# Patient Record
Sex: FEMALE | Race: WHITE | NOT HISPANIC OR LATINO | Employment: STUDENT | URBAN - METROPOLITAN AREA
[De-identification: names, ages, dates, MRNs, and addresses within clinical notes are randomized per-mention and may not be internally consistent; named-entity substitution may affect disease eponyms.]

---

## 2024-04-15 ENCOUNTER — OFFICE VISIT (OUTPATIENT)
Dept: URGENT CARE | Facility: CLINIC | Age: 19
End: 2024-04-15
Payer: COMMERCIAL

## 2024-04-15 VITALS
HEART RATE: 83 BPM | BODY MASS INDEX: 20.83 KG/M2 | HEIGHT: 65 IN | RESPIRATION RATE: 16 BRPM | WEIGHT: 125 LBS | DIASTOLIC BLOOD PRESSURE: 76 MMHG | TEMPERATURE: 99 F | OXYGEN SATURATION: 98 % | SYSTOLIC BLOOD PRESSURE: 125 MMHG

## 2024-04-15 DIAGNOSIS — Z02.0 SCHOOL PHYSICAL EXAM: Primary | ICD-10-CM

## 2024-04-15 DIAGNOSIS — Z11.1 ENCOUNTER FOR PPD SKIN TEST READING: ICD-10-CM

## 2024-04-15 PROCEDURE — 99499 UNLISTED E&M SERVICE: CPT | Mod: S$GLB,,, | Performed by: STUDENT IN AN ORGANIZED HEALTH CARE EDUCATION/TRAINING PROGRAM

## 2024-04-15 PROCEDURE — 86580 TB INTRADERMAL TEST: CPT | Mod: S$GLB,,, | Performed by: STUDENT IN AN ORGANIZED HEALTH CARE EDUCATION/TRAINING PROGRAM

## 2024-04-15 NOTE — PROGRESS NOTES
"Subjective:      Patient ID: Carmencita Groves is a 18 y.o. female.    Vitals:  height is 5' 5" (1.651 m) and weight is 56.7 kg (125 lb). Her oral temperature is 98.7 °F (37.1 °C). Her blood pressure is 125/76 and her pulse is 83. Her respiration is 16 and oxygen saturation is 98%.     Chief Complaint: Physical Exam    The patient is coming in for nursing school physical.  Patient got a TB skin test.  Patient brought a copy of her vaccine records.        Constitution: Negative for fever.   Cardiovascular:  Negative for chest pain.   Respiratory:  Negative for cough and shortness of breath.       Objective:     Physical Exam   Constitutional:      Comments:Please see uploaded documents         Assessment:     1. School physical exam    2. Encounter for PPD skin test reading      Vision Screening    Right eye Left eye Both eyes   Without correction      With correction 20/20 20/20 20/20       Plan:       School physical exam    Encounter for PPD skin test reading  -     POCT TB Skin Test Read    Patient was notified when to return for TB skin test reading      Medical Decision Making:   Differential Diagnosis:   Nursing school physical completed  Urgent Care Management:  Nursing school physical completed.  Patient also got a TB skin test while she was here.           "

## 2024-04-17 LAB
TB INDURATION - 48 HR READ: 0 MM
TB INDURATION - 72 HR READ: NORMAL
TB SKIN TEST - 48 HR READ: NEGATIVE
TB SKIN TEST - 72 HR READ: NORMAL

## 2024-06-25 ENCOUNTER — OFFICE VISIT (OUTPATIENT)
Dept: URGENT CARE | Facility: CLINIC | Age: 19
End: 2024-06-25
Payer: COMMERCIAL

## 2024-06-25 VITALS
RESPIRATION RATE: 18 BRPM | HEART RATE: 68 BPM | DIASTOLIC BLOOD PRESSURE: 77 MMHG | BODY MASS INDEX: 20.83 KG/M2 | SYSTOLIC BLOOD PRESSURE: 126 MMHG | WEIGHT: 125 LBS | TEMPERATURE: 98 F | OXYGEN SATURATION: 98 % | HEIGHT: 65 IN

## 2024-06-25 DIAGNOSIS — L23.7 POISON IVY DERMATITIS: Primary | ICD-10-CM

## 2024-06-25 DIAGNOSIS — R21 RASH: ICD-10-CM

## 2024-06-25 PROCEDURE — 99213 OFFICE O/P EST LOW 20 MIN: CPT | Mod: 25,S$GLB,, | Performed by: STUDENT IN AN ORGANIZED HEALTH CARE EDUCATION/TRAINING PROGRAM

## 2024-06-25 PROCEDURE — 96372 THER/PROPH/DIAG INJ SC/IM: CPT | Mod: S$GLB,,, | Performed by: STUDENT IN AN ORGANIZED HEALTH CARE EDUCATION/TRAINING PROGRAM

## 2024-06-25 RX ORDER — METHYLPREDNISOLONE ACETATE 80 MG/ML
40 INJECTION, SUSPENSION INTRA-ARTICULAR; INTRALESIONAL; INTRAMUSCULAR; SOFT TISSUE
Status: COMPLETED | OUTPATIENT
Start: 2024-06-25 | End: 2024-06-25

## 2024-06-25 RX ORDER — METHYLPREDNISOLONE 4 MG/1
TABLET ORAL
Qty: 21 EACH | Refills: 0 | Status: SHIPPED | OUTPATIENT
Start: 2024-06-25

## 2024-06-25 RX ORDER — HYDROXYZINE HYDROCHLORIDE 25 MG/1
25 TABLET, FILM COATED ORAL 3 TIMES DAILY PRN
Qty: 30 TABLET | Refills: 0 | Status: SHIPPED | OUTPATIENT
Start: 2024-06-25

## 2024-06-25 RX ADMIN — METHYLPREDNISOLONE ACETATE 40 MG: 80 INJECTION, SUSPENSION INTRA-ARTICULAR; INTRALESIONAL; INTRAMUSCULAR; SOFT TISSUE at 11:06

## 2024-06-25 NOTE — PROGRESS NOTES
"Subjective:      Patient ID: Carmencita Warren is a 18 y.o. female.    Vitals:  height is 5' 5" (1.651 m) and weight is 56.7 kg (125 lb). Her oral temperature is 98.1 °F (36.7 °C). Her blood pressure is 126/77 and her pulse is 68. Her respiration is 18 and oxygen saturation is 98%.     Chief Complaint: Rash    Pt presents with rash on right side, chest, and ankle. Possible poison ivy rash. She has been using triamcinolone steroid cream with little relief.    Rash  This is a new problem. The current episode started in the past 7 days. The problem is unchanged. The affected locations include the right hip, chest and left ankle. The rash is characterized by itchiness and redness. She was exposed to plant contact. Past treatments include topical steroids and anti-itch cream. The treatment provided no relief.     Skin:  Positive for rash.    Objective:     Physical Exam    Assessment:     No diagnosis found.    Plan:       There are no diagnoses linked to this encounter.                "

## 2024-06-25 NOTE — PATIENT INSTRUCTIONS
Please follow up with your primary care provider within 2-5 days if your signs and symptoms have not resolved or worsen.     If your condition worsens or fails to improve we recommend that you receive another evaluation at the emergency room immediately or contact your primary medical clinic to discuss your concerns.   You must understand that you have received an Urgent Care treatment only and that you may be released before all of your medical problems are known or treated. You, the patient, will arrange for follow up care as instructed.        Please start taking steroid pack tomorrow. You can take the atarax at night time for any itching. Please do not apply the topical steroid cream for no more than 10 days.  
No Vaccines Administered.

## 2024-07-03 NOTE — PROGRESS NOTES
"Subjective:      Patient ID: Carmencita Warren is a 19 y.o. female.    Vitals:  height is 5' 5" (1.651 m) and weight is 56.7 kg (125 lb). Her oral temperature is 98.1 °F (36.7 °C). Her blood pressure is 126/77 and her pulse is 68. Her respiration is 18 and oxygen saturation is 98%.     Chief Complaint: Rash    Pt presents with rash on right flank area, chest, and ankle. Possible poison ivy rash. She has been using triamcinolone steroid cream with little relief.    Rash  This is a new problem. The current episode started in the past 7 days. The problem is unchanged. The affected locations include the right hip, chest and left ankle. The rash is characterized by itchiness and redness. She was exposed to plant contact. Past treatments include topical steroids and anti-itch cream. The treatment provided no relief.       Skin:  Positive for rash.      Objective:     Physical Exam  HENT:   Head: Normocephalic and atraumatic.   Nose: Nose normal.   Abdominal: Normal appearance.   Neurological: She is alert.   Skin: Skin is rash and maculopapular.         Comments: Rash is on right flank area, chest, and ankle  The rash is maculopapular erythematous and pruritic, no drainage   Psychiatric: Her behavior is normal. Mood normal.      Assessment:     Poison ivy dermatitis   Plan:       Poison ivy dermatitis  -     methylPREDNISolone acetate injection 40 mg  -     methylPREDNISolone (MEDROL DOSEPACK) 4 mg tablet; use as directed  Dispense: 21 each; Refill: 0  -     hydrOXYzine HCL (ATARAX) 25 MG tablet; Take 1 tablet (25 mg total) by mouth 3 (three) times daily as needed for Itching.  Dispense: 30 tablet; Refill: 0    Rash  -     methylPREDNISolone acetate injection 40 mg  -     methylPREDNISolone (MEDROL DOSEPACK) 4 mg tablet; use as directed  Dispense: 21 each; Refill: 0  -     hydrOXYzine HCL (ATARAX) 25 MG tablet; Take 1 tablet (25 mg total) by mouth 3 (three) times daily as needed for Itching.  Dispense: 30 tablet; Refill: " 0      Please follow up with your primary care provider within 2-5 days if your signs and symptoms have not resolved or worsen.     If your condition worsens or fails to improve we recommend that you receive another evaluation at the emergency room immediately or contact your primary medical clinic to discuss your concerns.   You must understand that you have received an Urgent Care treatment only and that you may be released before all of your medical problems are known or treated. You, the patient, will arrange for follow up care as instructed.        Please start taking steroid pack tomorrow. You can take the atarax at night time for any itching. Please do not apply the topical steroid cream for no more than 10 days

## 2024-07-25 ENCOUNTER — OFFICE VISIT (OUTPATIENT)
Dept: OBSTETRICS AND GYNECOLOGY | Facility: CLINIC | Age: 19
End: 2024-07-25
Payer: COMMERCIAL

## 2024-07-25 VITALS
BODY MASS INDEX: 21.5 KG/M2 | HEART RATE: 73 BPM | SYSTOLIC BLOOD PRESSURE: 124 MMHG | DIASTOLIC BLOOD PRESSURE: 74 MMHG | WEIGHT: 129.19 LBS

## 2024-07-25 DIAGNOSIS — Z01.419 WELL WOMAN EXAM WITH ROUTINE GYNECOLOGICAL EXAM: Primary | ICD-10-CM

## 2024-07-25 DIAGNOSIS — Z76.89 ENCOUNTER TO ESTABLISH CARE: ICD-10-CM

## 2024-07-25 DIAGNOSIS — Z11.3 ENCOUNTER FOR SCREENING FOR INFECTIONS WITH PREDOMINANTLY SEXUAL MODE OF TRANSMISSION: ICD-10-CM

## 2024-07-25 PROCEDURE — 1160F RVW MEDS BY RX/DR IN RCRD: CPT | Mod: CPTII,S$GLB,,

## 2024-07-25 PROCEDURE — 1159F MED LIST DOCD IN RCRD: CPT | Mod: CPTII,S$GLB,,

## 2024-07-25 PROCEDURE — 3074F SYST BP LT 130 MM HG: CPT | Mod: CPTII,S$GLB,,

## 2024-07-25 PROCEDURE — 99395 PREV VISIT EST AGE 18-39: CPT | Mod: S$GLB,,,

## 2024-07-25 PROCEDURE — 3078F DIAST BP <80 MM HG: CPT | Mod: CPTII,S$GLB,,

## 2024-07-25 PROCEDURE — 99459 PELVIC EXAMINATION: CPT | Mod: S$GLB,,,

## 2024-07-25 PROCEDURE — 3008F BODY MASS INDEX DOCD: CPT | Mod: CPTII,S$GLB,,

## 2024-07-25 NOTE — PROGRESS NOTES
Subjective:      Patient ID: Cramencita Warren is a 19 y.o. female who presents for evaluation today.    Chief Complaint:    Well Woman and Contraception (Pt states that she stopped her Nuva Ring. She stopped it the end of June.)      History of Present Illness  HPI  Annual Exam (Premenopausal) - Patient presents for annual exam. The patient has no complaints today. The patient is sexually active. GYN screening history: no prior history of gyn screening tests. The patient wears seatbelts: yes. The patient participates in regular exercise: yes. Has the patient ever been transfused or tattooed?: not asked. The patient reports that there is not domestic violence in her life. She reports regular periods. She is using condoms for birth control. She denies GI or  problems.    GYN History  Patient's last menstrual period was 06/05/2024 (within weeks).   Date of Last Pap: N/A    VITALS  /74   Pulse 73   Wt 58.6 kg (129 lb 3.2 oz)   LMP 06/05/2024 (Within Weeks)   BMI 21.50 kg/m²   Weight: 58.6 kg (129 lb 3.2 oz)         PAST MEDICAL HISTORY  Past Medical History:   Diagnosis Date    ASD (atrial septal defect)     Heart murmur 2004       PAST SURGICAL HISTORY  Past Surgical History:   Procedure Laterality Date    ASD REPAIR  11/02/2010       SOCIAL HISTORY  Social History     Tobacco Use   Smoking Status Never   Smokeless Tobacco Never   ,   Social History     Substance and Sexual Activity   Alcohol Use Not Currently    Alcohol/week: 2.0 standard drinks of alcohol    Types: 2 Cans of beer per week        MEDICATIONS  No outpatient medications have been marked as taking for the 7/25/24 encounter (Office Visit) with Maite Williamson NP.         Review of Systems   Review of Systems   Constitutional:  Negative for activity change.   Eyes:  Negative for visual disturbance.   Respiratory:  Negative for shortness of breath.    Cardiovascular:  Negative for chest pain.   Gastrointestinal:  Negative for abdominal  pain.   Genitourinary:  Negative for vaginal bleeding.        No abnormal vaginal bleeding   Musculoskeletal:  Negative for back pain.   Integumentary:  Negative for rash and breast mass.   Neurological:  Negative for numbness.   Psychiatric/Behavioral:          No mood disturbance or changes    Breast: Negative for mass          Objective:     Physical Exam:   Constitutional: She is oriented to person, place, and time. She appears well-developed. She is cooperative.    HENT:   Head: Normocephalic.     Neck: Trachea normal. No thyromegaly present.    Cardiovascular:  Normal rate, regular rhythm and normal heart sounds.             Pulmonary/Chest: Effort normal and breath sounds normal.        Abdominal: Soft. There is no abdominal tenderness. There is no rebound and no guarding.     Genitourinary:    Vagina and uterus normal.      Pelvic exam was performed with patient supine.   The external female genitalia was normal.     Labial bartholins normal.There is no lesion on the right labia. There is no lesion on the left labia. Cervix is normal. Right adnexum displays no mass and no tenderness. Left adnexum displays no mass and no tenderness. Cervix exhibits no discharge. Uterus is not enlarged and not tender.              Lymphadenopathy:        Head (right side): No submental and no submandibular adenopathy present.        Head (left side): No submental and no submandibular adenopathy present.     She has no cervical adenopathy.    Neurological: She is alert and oriented to person, place, and time.    Skin: Skin is warm.    Psychiatric: She has a normal mood and affect. Her speech is normal and behavior is normal. Thought content normal.          Assessment:     Well woman exam with routine gynecological exam    Encounter for screening for infections with predominantly sexual mode of transmission  -     C. trachomatis/N. gonorrhoeae by AMP DNA Other; Cervicovaginal    Encounter to establish care  -     Ambulatory  referral/consult to Internal Medicine; Future; Expected date: 08/01/2024         Plan:     Patient instructed to contact the clinic should any questions or concerns arise prior to her next office visit. Patient is happy with the plan of care at this time, verbalizes understanding and denies outstanding questions.      Self-breast exams  Birth Control discussed  If you don't hear from the office regarding results within 1 week, please call  Follow up in 1 year for annual or sooner as needed  Chaperone present for exam   Female chaperone present.

## 2024-10-30 ENCOUNTER — OFFICE VISIT (OUTPATIENT)
Dept: PRIMARY CARE CLINIC | Facility: CLINIC | Age: 19
End: 2024-10-30
Payer: COMMERCIAL

## 2024-10-30 VITALS
DIASTOLIC BLOOD PRESSURE: 76 MMHG | HEIGHT: 65 IN | BODY MASS INDEX: 21.69 KG/M2 | OXYGEN SATURATION: 99 % | SYSTOLIC BLOOD PRESSURE: 124 MMHG | WEIGHT: 130.19 LBS | HEART RATE: 68 BPM

## 2024-10-30 DIAGNOSIS — Q21.10 ASD (ATRIAL SEPTAL DEFECT): ICD-10-CM

## 2024-10-30 DIAGNOSIS — Z00.00 WELLNESS EXAMINATION: Primary | ICD-10-CM

## 2024-10-30 DIAGNOSIS — Z71.85 VACCINE COUNSELING: ICD-10-CM

## 2025-05-29 ENCOUNTER — PATIENT MESSAGE (OUTPATIENT)
Dept: OBSTETRICS AND GYNECOLOGY | Facility: CLINIC | Age: 20
End: 2025-05-29
Payer: COMMERCIAL

## 2025-05-30 ENCOUNTER — TELEPHONE (OUTPATIENT)
Dept: OBSTETRICS AND GYNECOLOGY | Facility: CLINIC | Age: 20
End: 2025-05-30
Payer: COMMERCIAL

## 2025-05-30 DIAGNOSIS — Z34.90 EARLY STAGE OF PREGNANCY: Primary | ICD-10-CM

## 2025-05-30 NOTE — TELEPHONE ENCOUNTER
Spoke with patient. Lmp is 4/30/25. Advised to let us know if she has any bleeding or cramping and to start a prental and to come with a full bladder. Scheduled initial and ultrasound. Patient does have maternity coverage

## 2025-06-25 ENCOUNTER — PROCEDURE VISIT (OUTPATIENT)
Dept: OBSTETRICS AND GYNECOLOGY | Facility: CLINIC | Age: 20
End: 2025-06-25
Payer: COMMERCIAL

## 2025-06-25 ENCOUNTER — INITIAL PRENATAL (OUTPATIENT)
Dept: OBSTETRICS AND GYNECOLOGY | Facility: CLINIC | Age: 20
End: 2025-06-25
Payer: COMMERCIAL

## 2025-06-25 VITALS
DIASTOLIC BLOOD PRESSURE: 55 MMHG | BODY MASS INDEX: 22.66 KG/M2 | SYSTOLIC BLOOD PRESSURE: 113 MMHG | HEART RATE: 87 BPM | WEIGHT: 136.19 LBS

## 2025-06-25 DIAGNOSIS — Z34.90 EARLY STAGE OF PREGNANCY: ICD-10-CM

## 2025-06-25 DIAGNOSIS — Z36.89 ENCOUNTER FOR FETAL ANATOMIC SURVEY: ICD-10-CM

## 2025-06-25 DIAGNOSIS — Z36.89 ENCOUNTER FOR ULTRASOUND TO CHECK FETAL GROWTH: ICD-10-CM

## 2025-06-25 DIAGNOSIS — Z34.90 PREGNANCY, UNSPECIFIED GESTATIONAL AGE: Primary | ICD-10-CM

## 2025-06-25 LAB
AMORPH URATE CRY URNS QL MICRO: NORMAL
BACTERIA #/AREA URNS HPF: NORMAL /[HPF]
BILIRUB UR QL STRIP: NEGATIVE
CLARITY UR: CLEAR
COLOR UR: YELLOW
EPITHELIAL CELLS: NEGATIVE
GLUCOSE (UA): NEGATIVE MG/DL
KETONES UR QL STRIP: NEGATIVE MG/DL
LEUKOCYTE ESTERASE UR QL STRIP: NEGATIVE
MUCOUS THREADS URNS QL MICRO: NEGATIVE
NITRITE UR QL STRIP: NEGATIVE
OCCULT BLOOD: NEGATIVE
PH, URINE: 6 (ref 5–7.5)
PROT UR QL STRIP: NEGATIVE MG/DL
RBC/HPF: NEGATIVE
REFERENCE LAB TEST: NORMAL
REFERENCE LAB TEST: NORMAL
SP GR UR STRIP: 1.01 (ref 1–1.03)
UROBILINOGEN, URINE: NORMAL E.U./DL (ref 0–1)
WBC/HPF: NEGATIVE

## 2025-06-25 PROCEDURE — 0500F INITIAL PRENATAL CARE VISIT: CPT | Mod: CPTII,,, | Performed by: OBSTETRICS & GYNECOLOGY

## 2025-06-25 PROCEDURE — 76801 OB US < 14 WKS SINGLE FETUS: CPT | Mod: 26,S$PBB,, | Performed by: OBSTETRICS & GYNECOLOGY

## 2025-06-25 RX ORDER — PROMETHAZINE HYDROCHLORIDE AND DEXTROMETHORPHAN HYDROBROMIDE 6.25; 15 MG/5ML; MG/5ML
SYRUP ORAL
COMMUNITY
Start: 2025-05-06

## 2025-06-25 RX ORDER — AMOXICILLIN AND CLAVULANATE POTASSIUM 875; 125 MG/1; MG/1
1 TABLET, FILM COATED ORAL 2 TIMES DAILY
COMMUNITY
Start: 2025-05-06

## 2025-06-25 RX ORDER — BENZONATATE 200 MG/1
CAPSULE ORAL
COMMUNITY
Start: 2025-05-06

## 2025-06-25 NOTE — PROGRESS NOTES
Discussed reasons to go to the office or to go to the emergency room ectopic and miscarriage precautions  Discussed over-the-counter medications and medications for nausea if needed.  Patient information regarding new pregnancy given  Questions answered at this time  Will return for a new OB appointment  Early DM and ASA assessment done    Counseled the patient regarding her due date and that we do not change her today based off of later ultrasounds.  Also discussed the importance of taking prenatal vitamins.  Discussed bleeding in pregnancy with intercourse or any exams in pregnancy.  Discussed when to call the office and when to go to labor and delivery.  We discussed the multiple marker screen as well as the other options available for her to determine common aneuploidy as well as foresight carrier screening.  We discussed nausea vomiting symptoms and medications to ameliorate symptoms.  We discussed the plans for ultrasounds throughout the pregnancy.  The patient was given a pregnancy pamphlet and information and all questions were answered   Will do preq and fs

## 2025-06-26 LAB
ABS NRBC COUNT: 0 X 10 3/UL (ref 0–0.01)
ABSOLUTE BASOPHIL: 0.03 X 10 3/UL (ref 0–0.22)
ABSOLUTE EOSINOPHIL: 0.05 X 10 3/UL (ref 0.04–0.54)
ABSOLUTE IMMATURE GRAN: 0.04 X 10 3/UL (ref 0–0.04)
ABSOLUTE LYMPHOCYTE: 1.46 X 10 3/UL (ref 0.86–4.75)
ABSOLUTE MONOCYTE: 0.64 X 10 3/UL (ref 0.22–1.08)
ANTIBODY SCREEN: NEGATIVE
BASOPHILS NFR BLD: 0.3 % (ref 0.2–1.2)
BLOOD GROUPING: NORMAL
BLOOD TYPE (D): POSITIVE
EOSINOPHIL NFR BLD: 0.5 % (ref 0.7–7)
HBV SURFACE AG SERPL QL IA: NONREACTIVE
HCT VFR BLD AUTO: 39.6 % (ref 37–47)
HCV C PCR RNA CONFIRM: NORMAL
HCV IGG SERPL QL IA: NONREACTIVE
HEP BSAG CONFIRMATION: NORMAL
HGB BLD-MCNC: 13.3 G/DL (ref 12–16)
HIV 1+2 AB+HIV1 P24 AG SERPL QL IA: NONREACTIVE
HIV TESTING:: NORMAL
IMMATURE GRANULOCYTES: 0.4 % (ref 0–0.5)
LYMPHOCYTES NFR BLD: 15.6 % (ref 19.3–53.1)
MCH RBC QN AUTO: 29.7 PG (ref 27–32)
MCHC RBC AUTO-ENTMCNC: 33.6 G/DL (ref 32–36)
MCV RBC AUTO: 88.4 FL (ref 82–100)
MONOCYTES NFR BLD: 6.9 % (ref 4.7–12.5)
NEUTROPHILS # BLD AUTO: 7.11 X 10 3/UL (ref 2.15–7.56)
NEUTROPHILS NFR BLD: 76.3 % (ref 34–71.1)
NUCLEATED RED BLOOD CELLS: 0 /100 WBC (ref 0–0.2)
PLATELET # BLD AUTO: 228 X 10 3/UL (ref 135–400)
RBC # BLD AUTO: 4.48 X 10 6/UL (ref 4.2–5.4)
RDW-SD: 40.6 FL (ref 37–54)
RUBELLA IGG SCREEN: POSITIVE
WBC # BLD: 9.33 X 10 3/UL (ref 4.3–10.8)

## 2025-06-30 ENCOUNTER — TELEPHONE (OUTPATIENT)
Dept: OBSTETRICS AND GYNECOLOGY | Facility: CLINIC | Age: 20
End: 2025-06-30
Payer: COMMERCIAL

## 2025-06-30 ENCOUNTER — RESULTS FOLLOW-UP (OUTPATIENT)
Dept: OBSTETRICS AND GYNECOLOGY | Facility: CLINIC | Age: 20
End: 2025-06-30

## 2025-07-02 ENCOUNTER — PATIENT MESSAGE (OUTPATIENT)
Dept: OBSTETRICS AND GYNECOLOGY | Facility: CLINIC | Age: 20
End: 2025-07-02
Payer: COMMERCIAL

## 2025-07-07 ENCOUNTER — PATIENT MESSAGE (OUTPATIENT)
Dept: OBSTETRICS AND GYNECOLOGY | Facility: CLINIC | Age: 20
End: 2025-07-07
Payer: COMMERCIAL

## 2025-07-28 ENCOUNTER — ROUTINE PRENATAL (OUTPATIENT)
Dept: OBSTETRICS AND GYNECOLOGY | Facility: CLINIC | Age: 20
End: 2025-07-28
Payer: COMMERCIAL

## 2025-07-28 VITALS
HEART RATE: 82 BPM | SYSTOLIC BLOOD PRESSURE: 122 MMHG | DIASTOLIC BLOOD PRESSURE: 75 MMHG | BODY MASS INDEX: 23.06 KG/M2 | WEIGHT: 138.63 LBS

## 2025-07-28 DIAGNOSIS — Z34.90 PREGNANCY, UNSPECIFIED GESTATIONAL AGE: Primary | ICD-10-CM

## 2025-07-28 PROCEDURE — 0502F SUBSEQUENT PRENATAL CARE: CPT | Mod: CPTII,,, | Performed by: OBSTETRICS & GYNECOLOGY

## 2025-08-20 ENCOUNTER — PATIENT MESSAGE (OUTPATIENT)
Dept: OTHER | Facility: OTHER | Age: 20
End: 2025-08-20
Payer: COMMERCIAL

## 2025-08-20 ENCOUNTER — ROUTINE PRENATAL (OUTPATIENT)
Dept: OBSTETRICS AND GYNECOLOGY | Facility: CLINIC | Age: 20
End: 2025-08-20
Payer: COMMERCIAL

## 2025-08-20 VITALS
BODY MASS INDEX: 23.8 KG/M2 | SYSTOLIC BLOOD PRESSURE: 130 MMHG | HEART RATE: 79 BPM | WEIGHT: 143 LBS | DIASTOLIC BLOOD PRESSURE: 77 MMHG

## 2025-08-20 DIAGNOSIS — Z34.90 PREGNANCY, UNSPECIFIED GESTATIONAL AGE: Primary | ICD-10-CM

## 2025-08-20 PROCEDURE — 0502F SUBSEQUENT PRENATAL CARE: CPT | Mod: CPTII,,, | Performed by: OBSTETRICS & GYNECOLOGY

## 2025-08-27 ENCOUNTER — PATIENT MESSAGE (OUTPATIENT)
Dept: OTHER | Facility: OTHER | Age: 20
End: 2025-08-27
Payer: COMMERCIAL

## 2025-09-05 ENCOUNTER — TELEPHONE (OUTPATIENT)
Dept: OBSTETRICS AND GYNECOLOGY | Facility: CLINIC | Age: 20
End: 2025-09-05
Payer: COMMERCIAL